# Patient Record
Sex: FEMALE | Race: WHITE | URBAN - METROPOLITAN AREA
[De-identification: names, ages, dates, MRNs, and addresses within clinical notes are randomized per-mention and may not be internally consistent; named-entity substitution may affect disease eponyms.]

---

## 2022-08-04 ENCOUNTER — TELEPHONE (OUTPATIENT)
Dept: URGENT CARE | Age: 27
End: 2022-08-04

## 2022-08-04 ENCOUNTER — OFFICE VISIT (OUTPATIENT)
Dept: URGENT CARE | Age: 27
End: 2022-08-04
Payer: COMMERCIAL

## 2022-08-04 VITALS
HEART RATE: 99 BPM | SYSTOLIC BLOOD PRESSURE: 105 MMHG | TEMPERATURE: 98 F | RESPIRATION RATE: 18 BRPM | HEIGHT: 59 IN | BODY MASS INDEX: 44.35 KG/M2 | OXYGEN SATURATION: 100 % | DIASTOLIC BLOOD PRESSURE: 73 MMHG | WEIGHT: 220 LBS

## 2022-08-04 DIAGNOSIS — S99.912A INJURY OF LEFT ANKLE, INITIAL ENCOUNTER: ICD-10-CM
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
## 2022-08-04 PROCEDURE — G8417 CALC BMI ABV UP PARAM F/U: HCPCS

## 2022-08-04 PROCEDURE — G8428 CUR MEDS NOT DOCUMENT: HCPCS

## 2022-08-04 PROCEDURE — 99203 OFFICE O/P NEW LOW 30 MIN: CPT

## 2022-08-04 PROCEDURE — 4004F PT TOBACCO SCREEN RCVD TLK: CPT

## 2022-08-04 RX ORDER — IBUPROFEN 800 MG/1
800 TABLET ORAL EVERY 6 HOURS PRN
Qty: 28 TABLET | Refills: 0 | Status: SHIPPED | OUTPATIENT
Start: 2022-08-04 | End: 2022-08-11

## 2022-08-04 ASSESSMENT — ENCOUNTER SYMPTOMS
GASTROINTESTINAL NEGATIVE: 1
RESPIRATORY NEGATIVE: 1

## 2022-08-04 NOTE — PATIENT INSTRUCTIONS
X ray today- will notify you of the results  Obtain rest.  Take medications as prescribed. Discussed medication side effects. Take OTC pain relievers as needed. Avoid heavy lifting. Stay as active as possible without causing more pain. Use ice for first 2-3 days after pain started. Use for 20-30 minutes every 2 hours. Use heat after first 48-72 hours. Apply for 20-30 minutes every 2 hours. Discussed precautions and indications for returning to clinic. Discussed precautions and indications for seeking ED care.

## 2022-08-04 NOTE — PROGRESS NOTES
Behavior: Behavior normal.         Thought Content: Thought content normal.         Judgment: Judgment normal.       ASSESSMENT:  1. Fall (on) (from) other stairs and steps, initial encounter  -     XR ANKLE LEFT (MIN 3 VIEWS); Future  -     ibuprofen (ADVIL;MOTRIN) 800 MG tablet; Take 1 tablet by mouth every 6 hours as needed for Pain, Disp-28 tablet, R-0Normal  2. Injury of left ankle, initial encounter  -     ibuprofen (ADVIL;MOTRIN) 800 MG tablet; Take 1 tablet by mouth every 6 hours as needed for Pain, Disp-28 tablet, R-0Normal    Air gel brace placed on left ankle    PLAN:  X ray today- will notify you of the results  Obtain rest.  Take medications as prescribed. Discussed medication side effects. Take OTC pain relievers as needed. Avoid heavy lifting. Stay as active as possible without causing more pain. Use ice for first 2-3 days after pain started. Use for 20-30 minutes every 2 hours. Use heat after first 48-72 hours. Apply for 20-30 minutes every 2 hours. Discussed precautions and indications for returning to clinic. Return if symptoms worsen or fail to improve.      Electronically signed by CINDY Hicks CNP on 8/4/2022 at 11:12 AM.

## 2022-08-04 NOTE — TELEPHONE ENCOUNTER
Notified pt of Xray results. Instructed pt to continue with current treatment plan and call office if no improvement and will place PT/Orth referral. Pt agreeable and voices no questions or concerns.